# Patient Record
Sex: FEMALE | Race: WHITE | URBAN - METROPOLITAN AREA
[De-identification: names, ages, dates, MRNs, and addresses within clinical notes are randomized per-mention and may not be internally consistent; named-entity substitution may affect disease eponyms.]

---

## 2017-08-03 ENCOUNTER — OFFICE VISIT (OUTPATIENT)
Dept: PODIATRY | Facility: CLINIC | Age: 57
End: 2017-08-03
Payer: COMMERCIAL

## 2017-08-03 VITALS
BODY MASS INDEX: 33.75 KG/M2 | HEART RATE: 82 BPM | SYSTOLIC BLOOD PRESSURE: 132 MMHG | WEIGHT: 210 LBS | DIASTOLIC BLOOD PRESSURE: 82 MMHG | HEIGHT: 66 IN

## 2017-08-03 DIAGNOSIS — L60.0 INGROWING NAIL: Primary | ICD-10-CM

## 2017-08-03 PROCEDURE — 99203 OFFICE O/P NEW LOW 30 MIN: CPT | Performed by: PODIATRIST

## 2017-08-03 RX ORDER — FLUTICASONE PROPIONATE 50 MCG
1 SPRAY, SUSPENSION (ML) NASAL
COMMUNITY
Start: 2016-07-29

## 2017-08-03 RX ORDER — PROPRANOLOL HYDROCHLORIDE 40 MG/1
TABLET ORAL
Refills: 0 | COMMUNITY
Start: 2017-07-10

## 2017-08-03 RX ORDER — LEVOTHYROXINE SODIUM 112 UG/1
112 TABLET ORAL DAILY
Refills: 3 | COMMUNITY
Start: 2017-07-22

## 2017-08-03 RX ORDER — NABUMETONE 500 MG/1
TABLET, FILM COATED ORAL
Refills: 0 | COMMUNITY
Start: 2017-05-24

## 2017-08-03 RX ORDER — TEMAZEPAM 15 MG/1
CAPSULE ORAL
Refills: 1 | COMMUNITY
Start: 2017-05-26

## 2017-08-03 RX ORDER — OMEPRAZOLE 40 MG/1
CAPSULE, DELAYED RELEASE ORAL
COMMUNITY

## 2017-08-03 RX ORDER — TRAMADOL HYDROCHLORIDE 50 MG/1
TABLET ORAL
Refills: 1 | COMMUNITY
Start: 2016-12-06

## 2017-08-03 RX ORDER — SUMATRIPTAN 100 MG/1
TABLET, FILM COATED ORAL
Refills: 5 | COMMUNITY
Start: 2017-05-24

## 2017-08-03 NOTE — PATIENT INSTRUCTIONS
We wish you continued good healing. If you have any questions or concerns, please do not hesitate to contact us at [phone]      Please remember to call and schedule a follow up appointment if one was recommended at your earliest convenience.   PODIATRY CLINIC HOURS  TELEPHONE NUMBER    Dr. Shaan Waggoner D.P.M [facility]    Clinics:  [facility]        Emperatriz Parra MA  Medical Assistant  Tuesday 1PM-6PM  [facility]  Wednesday 7AM-2PM  [place]  Thursday 10AM-6PM  [place] Friday 7AM-345PM  [place]  Specialty schedulers:   [phone] to make an appointment with any Specialty Provider.        Urgent Care locations:    [facility] Monday-Friday 5 pm - 9 pm. Saturday-Sunday 9 am -5pm    Monday-Friday 11 am - 9 pm Saturday 9 am - 5 pm     Monday-Sunday 12 noon-8PM [phone]     If you need a medication refill, please contact us you may need lab work and/or a follow up visit prior to your refill (i.e. Antifungal medications).    [employer] (secure e-mail communication and access to your chart) to send a message or to make an appointment.    If MRI needed please call [facility] at [phone]        Weight management plan: Patient was referred to their PCP to discuss a diet and exercise plan.

## 2017-08-03 NOTE — NURSING NOTE
"Chief Complaint   Patient presents with     Ingrown Toenail     right big toe       Initial /82  Pulse 82  Ht 1.676 m (5' 6\")  Wt 95.3 kg (210 lb)  BMI 33.89 kg/m2 Estimated body mass index is 33.89 kg/(m^2) as calculated from the following:    Height as of this encounter: 1.676 m (5' 6\").    Weight as of this encounter: 95.3 kg (210 lb).  Medication Reconciliation: complete  "

## 2017-08-03 NOTE — PROGRESS NOTES
Subjective:    Pt is seen today as a new pt referral w/ the c/c of a painful ingrown right great nail both border.  This has been problematic for years off and on. negativehistory of drainage from the site. This is slowly getting worse.  Aggravated by activity and relieved by rest.  Has tried soaking which has not helped.   denies history of trauma to the area.  Having right knee replacement next week.  Had permanent on left hallux both borders. 2012 and worked very well for her.      ROS:  Denies fever and chill, denies numbness and tingling, denies erythema on dorsum of foot.       Allergies   Allergen Reactions     No Known Drug Allergies        Current Outpatient Prescriptions   Medication Sig Dispense Refill     omeprazole (PRILOSEC) 40 MG capsule        fluticasone (FLONASE) 50 MCG/ACT spray 1 spray       propranolol (INDERAL) 40 MG tablet TAKE 1 TABLET (40MG) BY ORAL ROUTE 2 TIMES EVERY DAY NEED APPT  0     diclofenac (VOLTAREN) 50 MG EC tablet TAKE 1 TAB BY MOUTH TWICE DAILY  1     temazepam (RESTORIL) 15 MG capsule TAKE 1-2 CAPSULES BY MOUTH EVERY DAY AT BEDTIME AS NEEDED  1     nabumetone (RELAFEN) 500 MG tablet 1 TABLET BY MOUTH TWICE A DAY AS NEEDED (PRN)  0     SUMAtriptan (IMITREX) 100 MG tablet TAKE 1 TABLET P0 AT THE ONSET OF MIGRAINE MAY REPEAT AFTER 2 HOURS DONOT EXCEED 200 MG DAILY MAX 9/M  5     levothyroxine (SYNTHROID/LEVOTHROID) 112 MCG tablet Take 112 mcg by mouth daily  3     traMADol (ULTRAM) 50 MG tablet TAKE 1 TABLET BY MOUTH 4 TIMES A DAY AS NEEDED  1       Patient Active Problem List   Diagnosis     Hypothyroidism     Allergic rhinitis     Tobacco use disorder     Congenital abnormalities of pregnant uterus, antepartum     Obesity     Allergic rhinitis     Hypothyroidism       Past Medical History:   Diagnosis Date     Allergic rhinitis, cause unspecified     year round     Congenital abnormalities of pregnant uterus, antepartum     12/2004. State she has a small vagina - no uterus but  "both ovaries     Obesity, unspecified      Tobacco use disorder      Unspecified hypothyroidism        Past Surgical History:   Procedure Laterality Date     APPENDECTOMY  2011     HC REMOVAL OF TONSILS,<14 Y/O         Family History   Problem Relation Age of Onset     C.A.D. Father      heart attack age 50     Hypertension Father        Social History   Substance Use Topics     Smoking status: Former Smoker     Packs/day: 1.00     Types: Cigarettes     Smokeless tobacco: Never Used     Alcohol use 6.0 oz/week         /82  Pulse 82  Ht 1.676 m (5' 6\")  Wt 95.3 kg (210 lb)  BMI 33.89 kg/m2.  A&O X 3.  Good historian.  Pulses DP, PT 2/4 b/l.  CRT < 3 seconds X 10 digits.  No edema or varicosities noted.  Sensation to light touch intact b/l.  Reflexes 2/4 b/l.  Skin has normal texture and turgor b/l.  Normal arch with weightbearing.  No forefoot or rear foot deformities noted.  MS 5/5 all compartments.  Normal ROM all fore foot and rearfoot joints.  No equinus.  right great toe nail both border shows soft tissue impingement with localized erythema.   negative active drainage/purulence at this time.  No sinus tracts.  No nailbed masses or exostosis.  No pain with range of motion of IPJ or MTPJ.  No ascending cellulitis.    ASSESSMENT:    Onychocryptosis with paronychia right both border.    Discussed etiology and treatment options in detail w/ the pt.  The potential causes and nature of an ingrown toenail were discussed with the patient.  We reviewed the natural history/prognosis of the condition and potential risks if no treatment is provided.      Treatment options discussed included conservative management (oral antibiotics, soaking of foot, adequate width shoes)  as well as surgical management (partial or total nail removal).  The pros and cons of both forms of treatment were reviewed.  Handout given to patient.      After thorough discussion and answering all questions, the patient elected to have " permanent removal of affected nail border.  Risk complications and efficacy discussed with patient.  Explained that she may not have this done until surgeon doing her knee replacement gives her the OK.  She will return then and have this done.    Shaan Waggoner DPM, FACFAS

## 2017-08-03 NOTE — MR AVS SNAPSHOT
After Visit Summary   8/3/2017    Luh Cruz    MRN: [id number]           Patient Information     Date Of Birth          1960        Visit Information        Provider Department      8/3/2017 5:00 PM Shaan Waggoner DPM [facility]        Care Instructions    We wish you continued good healing. If you have any questions or concerns, please do not hesitate to contact us at [phone]      Please remember to call and schedule a follow up appointment if one was recommended at your earliest convenience.   PODIATRY CLINIC HOURS  TELEPHONE NUMBER    Dr. Shaan Waggoner D.P.M FAC FAS    Clinics:  [facility]        Emperatriz Parra MA  Medical Assistant  Tuesday 1PM-6PM  [place]  Wednesday 7AM-2PM  [place]  Thursday 10AM-6PM  [place]  Friday 7AM-345PM  [place]  Specialty schedulers:   [phone] to make an appointment with any Specialty Provider.        Urgent Care locations:    [facility] Monday-Friday 5 pm - 9 pm. Saturday-Sunday 9 am -5pm    Monday-Friday 11 am - 9 pm Saturday 9 am - 5 pm     Monday-Sunday 12 noon-8PM [phone]     If you need a medication refill, please contact us you may need lab work and/or a follow up visit prior to your refill (i.e. Antifungal medications).    [employer] (secure e-mail communication and access to your chart) to send a message or to make an appointment.    If MRI needed please call [facility] at [phone]        Weight management plan: Patient was referred to their PCP to discuss a diet and exercise plan.            Follow-ups after your visit        Your next 10 appointments already scheduled     Aug 03, 2017  5:00 PM CDT   Return Visit with Shaan Waggoner DPM   [facility] ([facility])    [address] [id number] [phone]         "      Who to contact     If you have questions or need follow up information about today's clinic visit or your schedule please contact [facility] directly at [phone].  Normal or non-critical lab and imaging results will be communicated to you by MyChart, letter or phone within 4 business days after the clinic has received the results. If you do not hear from us within 7 days, please contact the clinic through MyChart or phone. If you have a critical or abnormal lab result, we will notify you by phone as soon as possible.  Submit refill requests through [employer] or call your pharmacy and they will forward the refill request to us. Please allow 3 business days for your refill to be completed.          Additional Information About Your Visit        [employer] [facility] Information     [employer] lets you send messages to your doctor, view your test results, renew your prescriptions, schedule appointments and more. To sign up, go to [email or web address] . Click on \"Log in\" on the left side of the screen, which will take you to the Welcome page. Then click on \"Sign up Now\" on the right side of the page.     You will be asked to enter the access code listed below, as well as some personal information. Please follow the directions to create your username and password.     Your access code is: [email or web address]  Expires: 2017  4:50 PM     Your access code will  in 90 days. If you need help or a new code, please call your [facility] clinic or [phone].        Care EveryWhere ID     This is your Care EveryWhere ID. This could be used by other organizations to access your [facility] medical records  [id number]        Your Vitals Were     Pulse Height BMI (Body Mass Index)             82 1.676 m (5' 6\") 33.89 kg/m2          Blood Pressure from Last 3 Encounters:   17 132/82   12 120/80   11/10/11 120/80    Weight from Last 3 Encounters:   17 95.3 kg (210 lb)   12 94 kg (207 lb 3.2 oz) "   11/10/11 92.7 kg (204 lb 6.4 oz)              Today, you had the following     No orders found for display       Primary Care Provider Office Phone # Fax #    Ana Villalba PA-C [phone]       [facility] [address]  [facility] [address]        Equal Access to Services     PATY KULKARNI : Hadii aad ku hadasho Soomaali, waaxda luqadaha, qaybta kaalmada adeegyada, waxay idiin hayaan adeeg kharash la'aan . So [facility] [phone].    ATENCIÓN: Si habla español, tiene a herzog disposición servicios gratuitos de asistencia lingüística. Llame al [phone].    We comply with applicable federal civil rights laws and [place] laws. We do not discriminate on the basis of race, color, national origin, age, disability sex, sexual orientation or gender identity.            Thank you!     Thank you for choosing [facility]  for your care. Our goal is always to provide you with excellent care. Hearing back from our patients is one way we can continue to improve our services. Please take a few minutes to complete the written survey that you may receive in the mail after your visit with us. Thank you!             Your Updated Medication List - Protect others around you: Learn how to safely use, store and throw away your medicines at www.disposemymeds.org.          This list is accurate as of: 8/3/17  4:50 PM.  Always use your most recent med list.                   Brand Name Dispense Instructions for use Diagnosis    diclofenac 50 MG EC tablet    VOLTAREN     TAKE 1 TAB BY MOUTH TWICE DAILY        fluticasone 50 MCG/ACT spray    FLONASE     1 spray        levothyroxine 112 MCG tablet    SYNTHROID/LEVOTHROID     Take 112 mcg by mouth daily        nabumetone 500 MG tablet    RELAFEN     1 TABLET BY MOUTH TWICE A DAY AS NEEDED (PRN)        omeprazole 40 MG capsule    priLOSEC          propranolol 40 MG tablet    INDERAL     TAKE 1 TABLET (40MG) BY ORAL ROUTE 2 TIMES EVERY DAY NEED APPT         SUMAtriptan 100 MG tablet    IMITREX     TAKE 1 TABLET P0 AT THE ONSET OF MIGRAINE MAY REPEAT AFTER 2 HOURS DONOT EXCEED 200 MG DAILY MAX 9/M        temazepam 15 MG capsule    RESTORIL     TAKE 1-2 CAPSULES BY MOUTH EVERY DAY AT BEDTIME AS NEEDED        traMADol 50 MG tablet    ULTRAM     TAKE 1 TABLET BY MOUTH 4 TIMES A DAY AS NEEDED